# Patient Record
Sex: MALE | Race: WHITE | NOT HISPANIC OR LATINO
[De-identification: names, ages, dates, MRNs, and addresses within clinical notes are randomized per-mention and may not be internally consistent; named-entity substitution may affect disease eponyms.]

---

## 2017-01-21 ENCOUNTER — APPOINTMENT (OUTPATIENT)
Dept: RADIOLOGY | Facility: CLINIC | Age: 32
End: 2017-01-21

## 2017-01-21 ENCOUNTER — APPOINTMENT (OUTPATIENT)
Dept: MRI IMAGING | Facility: CLINIC | Age: 32
End: 2017-01-21

## 2017-01-21 ENCOUNTER — OUTPATIENT (OUTPATIENT)
Dept: OUTPATIENT SERVICES | Facility: HOSPITAL | Age: 32
LOS: 1 days | End: 2017-01-21

## 2017-01-21 PROBLEM — Z00.00 ENCOUNTER FOR PREVENTIVE HEALTH EXAMINATION: Status: ACTIVE | Noted: 2017-01-21

## 2017-09-21 ENCOUNTER — TRANSCRIPTION ENCOUNTER (OUTPATIENT)
Age: 32
End: 2017-09-21

## 2017-12-12 VITALS
TEMPERATURE: 96 F | SYSTOLIC BLOOD PRESSURE: 139 MMHG | OXYGEN SATURATION: 98 % | WEIGHT: 214.29 LBS | DIASTOLIC BLOOD PRESSURE: 99 MMHG | RESPIRATION RATE: 16 BRPM | HEIGHT: 74 IN | HEART RATE: 91 BPM

## 2017-12-12 NOTE — ASU PATIENT PROFILE, ADULT - PMH
ADHD (attention deficit hyperactivity disorder)    Lumbar herniated disc    Plantar fasciitis  right

## 2017-12-13 ENCOUNTER — OUTPATIENT (OUTPATIENT)
Dept: OUTPATIENT SERVICES | Facility: HOSPITAL | Age: 32
LOS: 1 days | Discharge: HOME | End: 2017-12-13
Payer: COMMERCIAL

## 2017-12-13 VITALS
SYSTOLIC BLOOD PRESSURE: 125 MMHG | RESPIRATION RATE: 16 BRPM | HEART RATE: 75 BPM | DIASTOLIC BLOOD PRESSURE: 73 MMHG | OXYGEN SATURATION: 97 % | TEMPERATURE: 97 F

## 2017-12-13 DIAGNOSIS — Z98.890 OTHER SPECIFIED POSTPROCEDURAL STATES: Chronic | ICD-10-CM

## 2017-12-13 PROCEDURE — 29827 SHO ARTHRS SRG RT8TR CUF RPR: CPT | Mod: LT

## 2017-12-13 PROCEDURE — C1713: CPT

## 2017-12-13 PROCEDURE — 29826 SHO ARTHRS SRG DECOMPRESSION: CPT | Mod: LT

## 2017-12-13 NOTE — PACU DISCHARGE NOTE - COMMENTS
Patient left unit per ambulatory accompanied by MUMTAZ Wang and walked out to main lobby to waiting wife/ family. No distress noted.

## 2024-08-20 NOTE — PACU DISCHARGE NOTE - NAUSEA/VOMITING:
None [Initial Visit ___] : [unfilled] is here today for an initial visit  for [unfilled] [Follow-up Visit ___] : a follow-up visit  for [unfilled]
